# Patient Record
Sex: FEMALE | URBAN - METROPOLITAN AREA
[De-identification: names, ages, dates, MRNs, and addresses within clinical notes are randomized per-mention and may not be internally consistent; named-entity substitution may affect disease eponyms.]

---

## 2023-01-04 ENCOUNTER — LAB OUTSIDE AN ENCOUNTER (OUTPATIENT)
Dept: URBAN - METROPOLITAN AREA CLINIC 27 | Facility: CLINIC | Age: 23
End: 2023-01-04

## 2023-01-04 ENCOUNTER — OFFICE VISIT (OUTPATIENT)
Dept: URBAN - METROPOLITAN AREA CLINIC 27 | Facility: CLINIC | Age: 23
End: 2023-01-04
Payer: COMMERCIAL

## 2023-01-04 ENCOUNTER — TELEPHONE ENCOUNTER (OUTPATIENT)
Dept: URBAN - METROPOLITAN AREA CLINIC 27 | Facility: CLINIC | Age: 23
End: 2023-01-04

## 2023-01-04 VITALS
BODY MASS INDEX: 23.66 KG/M2 | DIASTOLIC BLOOD PRESSURE: 83 MMHG | SYSTOLIC BLOOD PRESSURE: 118 MMHG | HEART RATE: 107 BPM | HEIGHT: 65 IN | WEIGHT: 142 LBS

## 2023-01-04 DIAGNOSIS — R19.7 DIARRHEA: ICD-10-CM

## 2023-01-04 DIAGNOSIS — R63.4 WEIGHT LOSS: ICD-10-CM

## 2023-01-04 DIAGNOSIS — R11.2 NAUSEA & VOMITING: ICD-10-CM

## 2023-01-04 DIAGNOSIS — Z87.19 HISTORY OF SMALL BOWEL OBSTRUCTION: ICD-10-CM

## 2023-01-04 DIAGNOSIS — K50.00 TERMINAL ILEITIS: ICD-10-CM

## 2023-01-04 DIAGNOSIS — R10.84 GENERALIZED ABDOMINAL PAIN: ICD-10-CM

## 2023-01-04 PROBLEM — 235709008 TERMINAL ILEITIS: Status: ACTIVE | Noted: 2023-01-04

## 2023-01-04 PROCEDURE — 99204 OFFICE O/P NEW MOD 45 MIN: CPT | Performed by: INTERNAL MEDICINE

## 2023-01-04 RX ORDER — HYOSCYAMINE SULFATE 0.12 MG/1
1 OR 2 TABLETS AS NEEDED FOR ABDOMINAL PAIN TABLET ORAL EVERY 6 HOURS
Qty: 90 | Refills: 2 | OUTPATIENT
Start: 2023-01-04 | End: 2023-04-04

## 2023-01-04 RX ORDER — PREDNISONE 10 MG/1
4 TABS QD X5D, THEN 3 TABS QD X5D, THEN 2 TABS QD X5D, THEN 1 TAB QD X5D TABLET ORAL ONCE A DAY
Qty: 50 TABLET | Refills: 0 | OUTPATIENT

## 2023-01-04 NOTE — PHYSICAL EXAM GASTROINTESTINAL
Abdomen is soft, mild/moderate TTP diffusely, tonio in the RLQ/lower abdomen, nondistended, no guarding or rigidity, no masses palpable, decreased bowel sounds tonio in the upper abdomen

## 2023-01-04 NOTE — HPI-TODAY'S VISIT:
Patient here self-referred for evaluation of multiple GI symptoms that began 2 weeks ago.  She began having generalized abdominal pain that radiated to her back.  It was quite severe.  She also had multiple episodes of nausea and vomiting of bile.  Her stools were loose, up to 3 stools per day.  They were not bloody nor were they nocturnal.  She denies any trigger for the onset of her symptoms.  She went to a local ER in [place] and was hospitalized there from December 22 to December 25.  None of her records are available but apparently she was diagnosed with "terminal ileitis".  She apparently also had a small bowel obstruction which was treated conservatively and without NG tube placement.  She was given steroids, antibiotics and antiemetics with improvement of her symptoms.  Stool studies were apparently negative.  She was discharged with 20 mg of prednisone daily (which she completed a few days ago) and a 11-day course of Cipro and Flagyl.  She continues to have fairly significant abdominal pain which seems to be worse in the right lower quadrant.  She has been using Tylenol without significant benefit.  She continues to have loose stools.  She does not take a probiotic and does not use any antidiarrheals.  She has lost 15 pounds since the onset of her symptoms but has recently regained some of this.  Her appetite is poor, though she is currently on a regular diet.  She notes that "acidic foods" definitely worsen her symptoms.  Her nausea and vomiting have improved with PRN ondansetron.  She has not had a prior upper or lower endoscopy.  There is no family history of colon cancer, polyps, or IBD.  She apparently had some "shin" bruising on her bilateral lower extremities the week before her hospitalization, though this appears to be improving.  She has not had any fever or chills.

## 2023-01-06 ENCOUNTER — TELEPHONE ENCOUNTER (OUTPATIENT)
Dept: URBAN - METROPOLITAN AREA CLINIC 27 | Facility: CLINIC | Age: 23
End: 2023-01-06

## 2023-01-06 ENCOUNTER — LAB OUTSIDE AN ENCOUNTER (OUTPATIENT)
Dept: URBAN - METROPOLITAN AREA CLINIC 27 | Facility: CLINIC | Age: 23
End: 2023-01-06

## 2023-01-08 LAB
A/G RATIO: 1.2
ABSOLUTE BASOPHILS: 22
ABSOLUTE EOSINOPHILS: 29
ABSOLUTE LYMPHOCYTES: 986
ABSOLUTE MONOCYTES: 1256
ABSOLUTE NEUTROPHILS: 5008
ALBUMIN: 3.3
ALKALINE PHOSPHATASE: 63
ALT (SGPT): 3
AMYLASE: 30
ANCA SCREEN: NEGATIVE
AST (SGOT): 8
BASOPHILS: 0.3
BILIRUBIN, TOTAL: 0.5
BUN/CREATININE RATIO: 8
BUN: 5
C-REACTIVE PROTEIN, QUANT: 36.4
CALCIUM: 8.6
CARBON DIOXIDE, TOTAL: 29
CHLORIDE: 101
CREATININE: 0.61
EGFR: 129
EOSINOPHILS: 0.4
GLOBULIN, TOTAL: 2.8
GLUCOSE: 75
HEMATOCRIT: 32.1
HEMOGLOBIN: 11
LIPASE: 9
LYMPHOCYTES: 13.5
MCH: 25.5
MCHC: 34.3
MCV: 74.5
MONOCYTES: 17.2
MPV: 10.1
MYELOPEROXIDASE ANTIBODY: <1
NEUTROPHILS: 68.6
PLATELET COUNT: 484
POTASSIUM: 3.6
PROTEIN, TOTAL: 6.1
PROTEINASE-3 ANTIBODY: <1
RDW: 17.9
RED BLOOD CELL COUNT: 4.31
SACCHAROMYCES CEREVISIAE AB (ASCA) (IGA): 44.7
SACCHAROMYCES CEREVISIAE AB (ASCA) (IGG): 82
SED RATE BY MODIFIED: 33
SODIUM: 136
TSH: 0.9
WHITE BLOOD CELL COUNT: 7.3

## 2023-01-17 ENCOUNTER — TELEPHONE ENCOUNTER (OUTPATIENT)
Dept: URBAN - METROPOLITAN AREA CLINIC 27 | Facility: CLINIC | Age: 23
End: 2023-01-17

## 2023-01-17 RX ORDER — SODIUM, POTASSIUM,MAG SULFATES 17.5-3.13G
177ML SOLUTION, RECONSTITUTED, ORAL ORAL 1
Qty: 177 MILLILITER | OUTPATIENT
Start: 2023-01-17 | End: 2023-01-18

## 2023-01-17 RX ORDER — HYOSCYAMINE SULFATE 0.12 MG/1
1 OR 2 TABLETS AS NEEDED FOR ABDOMINAL PAIN TABLET ORAL EVERY 6 HOURS
Qty: 90 | Refills: 2 | Status: ACTIVE | COMMUNITY
Start: 2023-01-04 | End: 2023-04-04

## 2023-01-17 RX ORDER — PREDNISONE 10 MG/1
4 TABS QD X5D, THEN 3 TABS QD X5D, THEN 2 TABS QD X5D, THEN 1 TAB QD X5D TABLET ORAL ONCE A DAY
Qty: 50 TABLET | Refills: 0 | Status: ACTIVE | COMMUNITY

## 2023-01-25 ENCOUNTER — WEB ENCOUNTER (OUTPATIENT)
Dept: URBAN - METROPOLITAN AREA CLINIC 27 | Facility: CLINIC | Age: 23
End: 2023-01-25

## 2023-01-26 ENCOUNTER — ERX REFILL RESPONSE (OUTPATIENT)
Dept: URBAN - METROPOLITAN AREA CLINIC 27 | Facility: CLINIC | Age: 23
End: 2023-01-26

## 2023-01-26 ENCOUNTER — WEB ENCOUNTER (OUTPATIENT)
Dept: URBAN - METROPOLITAN AREA CLINIC 27 | Facility: CLINIC | Age: 23
End: 2023-01-26

## 2023-01-26 RX ORDER — PREDNISONE 10 MG/1
TAKE 4 TABLETS BY MOUTH EVERY DAY FOR 5 DAYS, 3 FOR 5 DAYS, 2 FOR 5 DAYS, 1 FOR 5 DAYS TABLET ORAL ONCE A DAY
Qty: 50 TABLET | Refills: 0 | OUTPATIENT

## 2023-01-26 RX ORDER — PREDNISONE 10 MG/1
TAKE 4 TABLETS BY MOUTH EVERY DAY FOR 5 DAYS, 3 FOR 5 DAYS, 2 FOR 5 DAYS, 1 FOR 5 DAYS TABLET ORAL
Qty: 50 TABLET | Refills: 0 | OUTPATIENT

## 2023-01-26 RX ORDER — PREDNISONE 10 MG/1
TAKE 4 TABLETS BY MOUTH EVERY DAY FOR 5 DAYS, 3 FOR 5 DAYS, 2 FOR 5 DAYS, 1 FOR 5 DAYS TABLET ORAL
Qty: 50 TABLET | Refills: 0 | Status: ACTIVE | COMMUNITY

## 2023-01-26 RX ORDER — PREDNISONE 20 MG/1
2 TABLET TABLET ORAL ONCE A DAY
Qty: 60 TABLET | Refills: 1 | OUTPATIENT
Start: 2023-01-27 | End: 2023-03-28

## 2023-01-26 RX ORDER — HYOSCYAMINE SULFATE 0.12 MG/1
1 OR 2 TABLETS AS NEEDED FOR ABDOMINAL PAIN TABLET ORAL EVERY 6 HOURS
Qty: 90 | Refills: 2 | Status: ACTIVE | COMMUNITY
Start: 2023-01-04 | End: 2023-04-04

## 2023-02-20 ENCOUNTER — WEB ENCOUNTER (OUTPATIENT)
Dept: URBAN - METROPOLITAN AREA CLINIC 27 | Facility: CLINIC | Age: 23
End: 2023-02-20

## 2023-02-21 ENCOUNTER — WEB ENCOUNTER (OUTPATIENT)
Dept: URBAN - METROPOLITAN AREA SURGERY CENTER 7 | Facility: SURGERY CENTER | Age: 23
End: 2023-02-21

## 2023-02-24 ENCOUNTER — CLAIMS CREATED FROM THE CLAIM WINDOW (OUTPATIENT)
Dept: URBAN - METROPOLITAN AREA CLINIC 4 | Facility: CLINIC | Age: 23
End: 2023-02-24
Payer: COMMERCIAL

## 2023-02-24 ENCOUNTER — LAB OUTSIDE AN ENCOUNTER (OUTPATIENT)
Dept: URBAN - METROPOLITAN AREA CLINIC 27 | Facility: CLINIC | Age: 23
End: 2023-02-24

## 2023-02-24 ENCOUNTER — OFFICE VISIT (OUTPATIENT)
Dept: URBAN - METROPOLITAN AREA SURGERY CENTER 7 | Facility: SURGERY CENTER | Age: 23
End: 2023-02-24
Payer: COMMERCIAL

## 2023-02-24 ENCOUNTER — TELEPHONE ENCOUNTER (OUTPATIENT)
Dept: URBAN - METROPOLITAN AREA CLINIC 27 | Facility: CLINIC | Age: 23
End: 2023-02-24

## 2023-02-24 DIAGNOSIS — K52.89 (LYMPHOCYTIC) MICROSCOPIC COLITIS: ICD-10-CM

## 2023-02-24 DIAGNOSIS — R10.84 ABDOMINAL CRAMPING, GENERALIZED: ICD-10-CM

## 2023-02-24 DIAGNOSIS — R19.7 ACUTE DIARRHEA: ICD-10-CM

## 2023-02-24 DIAGNOSIS — K52.89 OTHER SPECIFIED NONINFECTIVE GASTROENTERITIS AND COLITIS: ICD-10-CM

## 2023-02-24 PROCEDURE — G8907 PT DOC NO EVENTS ON DISCHARG: HCPCS | Performed by: INTERNAL MEDICINE

## 2023-02-24 PROCEDURE — 45380 COLONOSCOPY AND BIOPSY: CPT | Performed by: INTERNAL MEDICINE

## 2023-02-24 PROCEDURE — 88305 TISSUE EXAM BY PATHOLOGIST: CPT | Performed by: PATHOLOGY

## 2023-02-24 RX ORDER — HYOSCYAMINE SULFATE 0.12 MG/1
1 OR 2 TABLETS AS NEEDED FOR ABDOMINAL PAIN TABLET ORAL EVERY 6 HOURS
Qty: 90 | Refills: 2 | Status: ACTIVE | COMMUNITY
Start: 2023-01-04 | End: 2023-04-04

## 2023-02-24 RX ORDER — PREDNISONE 10 MG/1
TAKE 4 TABLETS BY MOUTH EVERY DAY FOR 5 DAYS, 3 FOR 5 DAYS, 2 FOR 5 DAYS, 1 FOR 5 DAYS TABLET ORAL
Qty: 50 TABLET | Refills: 0 | Status: ACTIVE | COMMUNITY

## 2023-02-24 RX ORDER — PREDNISONE 20 MG/1
2 TABLET TABLET ORAL ONCE A DAY
Qty: 60 TABLET | Refills: 1 | Status: ACTIVE | COMMUNITY
Start: 2023-01-27 | End: 2023-03-28

## 2023-02-24 RX ORDER — MERCAPTOPURINE 50 MG/1
TAKE 1/2 TABLET ONCE DAILY FOR 2WKS THEN 1 TAB ONCE DAILY THEREAFTER TABLET ORAL ONCE DAILY
Qty: 30 | Refills: 2 | OUTPATIENT

## 2023-02-25 ENCOUNTER — TELEPHONE ENCOUNTER (OUTPATIENT)
Dept: URBAN - METROPOLITAN AREA CLINIC 27 | Facility: CLINIC | Age: 23
End: 2023-02-25

## 2023-03-01 ENCOUNTER — TELEPHONE ENCOUNTER (OUTPATIENT)
Dept: URBAN - METROPOLITAN AREA CLINIC 27 | Facility: CLINIC | Age: 23
End: 2023-03-01

## 2023-03-08 LAB
FERRITIN, SERUM: 94
HEPATITIS A AB, TOTAL: REACTIVE
HEPATITIS B CORE AB TOTAL: (no result)
HEPATITIS B SURFACE ANTIBODY QL: REACTIVE
HEPATITIS B SURFACE ANTIGEN: (no result)
IRON BIND.CAP.(TIBC): 276
IRON SATURATION: 19
IRON: 53
TPMT ACTIVITY: 11

## 2023-03-14 ENCOUNTER — TELEPHONE ENCOUNTER (OUTPATIENT)
Dept: URBAN - METROPOLITAN AREA CLINIC 27 | Facility: CLINIC | Age: 23
End: 2023-03-14

## 2023-03-15 ENCOUNTER — OFFICE VISIT (OUTPATIENT)
Dept: URBAN - METROPOLITAN AREA CLINIC 27 | Facility: CLINIC | Age: 23
End: 2023-03-15

## 2023-03-16 ENCOUNTER — CLAIMS CREATED FROM THE CLAIM WINDOW (OUTPATIENT)
Dept: URBAN - METROPOLITAN AREA MEDICAL CENTER 8 | Facility: MEDICAL CENTER | Age: 23
End: 2023-03-16

## 2023-03-16 ENCOUNTER — CLAIMS CREATED FROM THE CLAIM WINDOW (OUTPATIENT)
Dept: URBAN - METROPOLITAN AREA MEDICAL CENTER 8 | Facility: MEDICAL CENTER | Age: 23
End: 2023-03-16
Payer: COMMERCIAL

## 2023-03-16 DIAGNOSIS — K50.00 CICATRIZING ENTEROCOLITIS: ICD-10-CM

## 2023-03-16 DIAGNOSIS — R93.3 ABN FINDINGS-GI TRACT: ICD-10-CM

## 2023-03-16 PROCEDURE — 99222 1ST HOSP IP/OBS MODERATE 55: CPT | Performed by: INTERNAL MEDICINE

## 2023-03-16 PROCEDURE — G8427 DOCREV CUR MEDS BY ELIG CLIN: HCPCS | Performed by: INTERNAL MEDICINE

## 2023-03-16 PROCEDURE — 99254 IP/OBS CNSLTJ NEW/EST MOD 60: CPT | Performed by: INTERNAL MEDICINE

## 2023-03-17 ENCOUNTER — CLAIMS CREATED FROM THE CLAIM WINDOW (OUTPATIENT)
Dept: URBAN - METROPOLITAN AREA MEDICAL CENTER 8 | Facility: MEDICAL CENTER | Age: 23
End: 2023-03-17
Payer: COMMERCIAL

## 2023-03-17 ENCOUNTER — CLAIMS CREATED FROM THE CLAIM WINDOW (OUTPATIENT)
Dept: URBAN - METROPOLITAN AREA MEDICAL CENTER 8 | Facility: MEDICAL CENTER | Age: 23
End: 2023-03-17

## 2023-03-17 DIAGNOSIS — K50.00 CICATRIZING ENTEROCOLITIS: ICD-10-CM

## 2023-03-17 PROCEDURE — 99232 SBSQ HOSP IP/OBS MODERATE 35: CPT | Performed by: INTERNAL MEDICINE

## 2023-03-18 ENCOUNTER — CLAIMS CREATED FROM THE CLAIM WINDOW (OUTPATIENT)
Dept: URBAN - METROPOLITAN AREA MEDICAL CENTER 8 | Facility: MEDICAL CENTER | Age: 23
End: 2023-03-18
Payer: COMMERCIAL

## 2023-03-18 ENCOUNTER — OUT OF OFFICE VISIT (OUTPATIENT)
Dept: URBAN - METROPOLITAN AREA MEDICAL CENTER 8 | Facility: MEDICAL CENTER | Age: 23
End: 2023-03-18

## 2023-03-18 DIAGNOSIS — K50.00 CICATRIZING ENTEROCOLITIS: ICD-10-CM

## 2023-03-18 PROCEDURE — 99232 SBSQ HOSP IP/OBS MODERATE 35: CPT | Performed by: INTERNAL MEDICINE

## 2023-03-20 ENCOUNTER — OUT OF OFFICE VISIT (OUTPATIENT)
Dept: URBAN - METROPOLITAN AREA MEDICAL CENTER 8 | Facility: MEDICAL CENTER | Age: 23
End: 2023-03-20

## 2023-03-20 ENCOUNTER — CLAIMS CREATED FROM THE CLAIM WINDOW (OUTPATIENT)
Dept: URBAN - METROPOLITAN AREA MEDICAL CENTER 8 | Facility: MEDICAL CENTER | Age: 23
End: 2023-03-20
Payer: COMMERCIAL

## 2023-03-20 DIAGNOSIS — K50.00 CICATRIZING ENTEROCOLITIS: ICD-10-CM

## 2023-03-20 PROCEDURE — 99232 SBSQ HOSP IP/OBS MODERATE 35: CPT | Performed by: INTERNAL MEDICINE

## 2023-03-27 ENCOUNTER — TELEPHONE ENCOUNTER (OUTPATIENT)
Dept: URBAN - METROPOLITAN AREA CLINIC 27 | Facility: CLINIC | Age: 23
End: 2023-03-27

## 2023-03-30 ENCOUNTER — OFFICE VISIT (OUTPATIENT)
Dept: URBAN - METROPOLITAN AREA CLINIC 27 | Facility: CLINIC | Age: 23
End: 2023-03-30

## 2023-03-31 ENCOUNTER — TELEPHONE ENCOUNTER (OUTPATIENT)
Dept: URBAN - METROPOLITAN AREA CLINIC 35 | Facility: CLINIC | Age: 23
End: 2023-03-31

## 2023-03-31 ENCOUNTER — OFFICE VISIT (OUTPATIENT)
Dept: URBAN - METROPOLITAN AREA CLINIC 27 | Facility: CLINIC | Age: 23
End: 2023-03-31
Payer: COMMERCIAL

## 2023-03-31 ENCOUNTER — LAB OUTSIDE AN ENCOUNTER (OUTPATIENT)
Dept: URBAN - METROPOLITAN AREA CLINIC 27 | Facility: CLINIC | Age: 23
End: 2023-03-31

## 2023-03-31 VITALS
BODY MASS INDEX: 21.16 KG/M2 | SYSTOLIC BLOOD PRESSURE: 111 MMHG | DIASTOLIC BLOOD PRESSURE: 75 MMHG | HEIGHT: 65 IN | HEART RATE: 106 BPM | WEIGHT: 127 LBS

## 2023-03-31 DIAGNOSIS — R63.4 WEIGHT LOSS: ICD-10-CM

## 2023-03-31 DIAGNOSIS — R10.84 GENERALIZED ABDOMINAL PAIN: ICD-10-CM

## 2023-03-31 DIAGNOSIS — D64.89 OTHER SPECIFIED ANEMIAS: ICD-10-CM

## 2023-03-31 DIAGNOSIS — K50.00 TERMINAL ILEITIS: ICD-10-CM

## 2023-03-31 PROCEDURE — 99214 OFFICE O/P EST MOD 30 MIN: CPT | Performed by: INTERNAL MEDICINE

## 2023-03-31 RX ORDER — DICYCLOMINE HYDROCHLORIDE 20 MG/1
1 OR 2 TABLETS TABLET ORAL THREE TIMES A DAY
Qty: 90 | Refills: 3 | OUTPATIENT
Start: 2023-03-31 | End: 2023-04-30

## 2023-03-31 RX ORDER — PREDNISONE 10 MG/1
TAKE 4 TABLETS BY MOUTH EVERY DAY FOR 5 DAYS, 3 FOR 5 DAYS, 2 FOR 5 DAYS, 1 FOR 5 DAYS TABLET ORAL
Qty: 50 TABLET | Refills: 0 | Status: ACTIVE | COMMUNITY

## 2023-03-31 RX ORDER — MERCAPTOPURINE 50 MG/1
TAKE 1/2 TABLET ONCE DAILY FOR 2WKS THEN 1 TAB ONCE DAILY THEREAFTER TABLET ORAL ONCE DAILY
Qty: 30 | Refills: 2 | Status: ACTIVE | COMMUNITY

## 2023-03-31 RX ORDER — HYOSCYAMINE SULFATE 0.12 MG/1
1 OR 2 TABLETS AS NEEDED FOR ABDOMINAL PAIN TABLET ORAL EVERY 6 HOURS
Qty: 90 | Refills: 2 | Status: ACTIVE | COMMUNITY
Start: 2023-01-04 | End: 2023-04-04

## 2023-03-31 NOTE — HPI-TODAY'S VISIT:
Patient here for followup of suspected Crohn's ileitis >3mos.  She began having generalized abdominal pain that radiated to her back in late December 2022.  It was quite severe.  She also had multiple episodes of nausea and vomiting of bile.  Her stools were loose, up to 3 stools per day.  They were not bloody nor were they nocturnal.  She denies any trigger for the onset of her symptoms.  She went to a local ER in [place] and was hospitalized there from December 22 to December 25.  None of her records are available but apparently she was diagnosed with "terminal ileitis".  She apparently also had a small bowel obstruction which was treated conservatively and without NG tube placement.  She was given steroids, antibiotics and antiemetics with improvement of her symptoms.  Stool studies were apparently negative.  She was discharged with 20 mg of prednisone daily and a 11-day course of Cipro and Flagyl.  Her symptoms improved, though only transiently.  She was seen in this office in January, and was started back on prednisone 40mg QD; this was helpful.  She underwent colonoscopy last month.  The colon appeared normal though the mucosa at the ileocecal valve was severely erythematous, friable, inflamed, markedly nodular and with some pseudopolyps present.  The scope could not be passed across this area into the terminal ileum because of the luminal narrowing and inflammation.  Biopsies from this area showed active chronic colitis.  She was started on 6-MP following that exam.  TPMT activity was low.  She is immune to hepatitis A and B.  Her hemoglobin in January was 11.0.  Inflammatory bowel disease profile showed a markedly elevated Saccharomyces cerevisciae antibody, both IgA and IgG.  She was hospitalized earlier this month while because of intractable nausea/vomiting and abdominal pain.  CT showed a segment of distal ileum approximately 7 cm in length with marked bowel wall thickening and edema which involved the ileocecal junction.  There was marked luminal narrowing of the involved lumen.  There was a segment of small bowel proximal to this which had an air-fluid level measuring 4 cm in caliber.  There was mild generalized bowel wall thickening and edema of the colon.  Moderate fat stranding around the cecum was seen.  TB testing in the hospital was "indeterminate" but chest x-ray was normal.  She was made NPO, and IV steroids were initiated.  She was started on inflectra, with her first infusion on 3/16.  Her symptoms gradually improved, and she was eventually discharged on 3/21.  Her nausea and vomiting have resolved, and Phenergan is very helpful when she does have symptoms.  Her stools are somewhat irregular, though much improved from previously.  She has a bowel movement every other day.  She states "it sometimes takes a long time on the toilet to have a bowel movement".  Her appetite is improving and she is maintaining her weight.  She is on a low fiber diet.  She continues to have crampy abdominal pain and spasms which seem to be worse in the middle of the abdomen.  Hyoscyamine 1 tablet every 6 hours is somewhat helpful though it makes her sleepy.  She has been compliant with the 6MP 50 mg once daily and prednisone 40 mg once daily.  She received her second dose of inflectra yesterday, which she tolerated well.  She does complain of fatigue and some mild shortness of breath.  She was anemic on discharge with a hemoglobin of 9.3.

## 2023-04-01 LAB
ABSOLUTE BASOPHILS: 26
ABSOLUTE EOSINOPHILS: 26
ABSOLUTE LYMPHOCYTES: 1729
ABSOLUTE MONOCYTES: 1092
ABSOLUTE NEUTROPHILS: 5728
BASOPHILS: 0.3
EOSINOPHILS: 0.3
FERRITIN, SERUM: 183
HEMATOCRIT: 30.2
HEMOGLOBIN: 10.2
IRON BIND.CAP.(TIBC): 219
IRON SATURATION: 52
IRON: 114
LYMPHOCYTES: 20.1
MCH: 27.7
MCHC: 33.8
MCV: 82.1
MONOCYTES: 12.7
MPV: 9.4
NEUTROPHILS: 66.6
PLATELET COUNT: 636
RDW: 16.3
RED BLOOD CELL COUNT: 3.68
WHITE BLOOD CELL COUNT: 8.6

## 2023-04-10 ENCOUNTER — WEB ENCOUNTER (OUTPATIENT)
Dept: URBAN - METROPOLITAN AREA CLINIC 27 | Facility: CLINIC | Age: 23
End: 2023-04-10

## 2023-04-27 ENCOUNTER — TELEPHONE ENCOUNTER (OUTPATIENT)
Dept: URBAN - METROPOLITAN AREA CLINIC 35 | Facility: CLINIC | Age: 23
End: 2023-04-27

## 2023-04-27 RX ORDER — PROMETHAZINE HYDROCHLORIDE 25 MG/1
1/2 OR 1 TABLET AS NEEDED TABLET ORAL
Qty: 90 | Refills: 3 | OUTPATIENT
Start: 2023-04-27 | End: 2023-05-27

## 2023-05-11 ENCOUNTER — OFFICE VISIT (OUTPATIENT)
Dept: URBAN - METROPOLITAN AREA CLINIC 27 | Facility: CLINIC | Age: 23
End: 2023-05-11

## 2023-06-21 ENCOUNTER — TELEPHONE ENCOUNTER (OUTPATIENT)
Dept: URBAN - METROPOLITAN AREA CLINIC 27 | Facility: CLINIC | Age: 23
End: 2023-06-21

## 2023-06-22 ENCOUNTER — TELEPHONE ENCOUNTER (OUTPATIENT)
Dept: URBAN - METROPOLITAN AREA CLINIC 27 | Facility: CLINIC | Age: 23
End: 2023-06-22

## 2023-06-22 ENCOUNTER — OFFICE VISIT (OUTPATIENT)
Dept: URBAN - METROPOLITAN AREA CLINIC 27 | Facility: CLINIC | Age: 23
End: 2023-06-22

## 2023-08-21 ENCOUNTER — TELEPHONE ENCOUNTER (OUTPATIENT)
Dept: URBAN - METROPOLITAN AREA CLINIC 27 | Facility: CLINIC | Age: 23
End: 2023-08-21

## 2023-10-10 ENCOUNTER — LAB OUTSIDE AN ENCOUNTER (OUTPATIENT)
Dept: URBAN - METROPOLITAN AREA CLINIC 27 | Facility: CLINIC | Age: 23
End: 2023-10-10

## 2023-10-10 ENCOUNTER — OFFICE VISIT (OUTPATIENT)
Dept: URBAN - METROPOLITAN AREA CLINIC 27 | Facility: CLINIC | Age: 23
End: 2023-10-10
Payer: COMMERCIAL

## 2023-10-10 VITALS
DIASTOLIC BLOOD PRESSURE: 75 MMHG | HEART RATE: 97 BPM | BODY MASS INDEX: 22.49 KG/M2 | WEIGHT: 135 LBS | HEIGHT: 65 IN | SYSTOLIC BLOOD PRESSURE: 107 MMHG

## 2023-10-10 DIAGNOSIS — N32.1 COLOVESICAL FISTULA: ICD-10-CM

## 2023-10-10 DIAGNOSIS — R10.84 GENERALIZED ABDOMINAL PAIN: ICD-10-CM

## 2023-10-10 DIAGNOSIS — K50.00 CROHN'S DISEASE OF SMALL INTESTINE: ICD-10-CM

## 2023-10-10 DIAGNOSIS — R11.2 NAUSEA & VOMITING: ICD-10-CM

## 2023-10-10 PROCEDURE — 99214 OFFICE O/P EST MOD 30 MIN: CPT | Performed by: INTERNAL MEDICINE

## 2023-10-10 RX ORDER — MERCAPTOPURINE 50 MG/1
TAKE 1/2 TABLET ONCE DAILY FOR 2WKS THEN 1 TAB ONCE DAILY THEREAFTER TABLET ORAL ONCE DAILY
Qty: 30 | Refills: 2 | Status: ACTIVE | COMMUNITY

## 2023-10-10 RX ORDER — PREDNISONE 10 MG/1
TAKE 4 TABLETS BY MOUTH EVERY DAY FOR 5 DAYS, 3 FOR 5 DAYS, 2 FOR 5 DAYS, 1 FOR 5 DAYS TABLET ORAL
Qty: 50 TABLET | Refills: 0 | Status: ON HOLD | COMMUNITY

## 2023-10-10 NOTE — HPI-TODAY'S VISIT:
Patient here for followup of suspected Crohn's ileitis x1y.  She has not been seen in this office since March.  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  She was seen by urology and underwent cystoscopy in August which apparently confirmed the presence of the fistula. She was also seen by colorectal surgery, and surgical intervention was recommended, though the patient declined this; it was recommended that she follow-up here.  She states that she actually feels quite well. Her hematuria and pneumaturia have apparently resolved.  She is still receiving Inflectra 300mg every 8 weeks (since March) every 8 weeks. She is due for her next infusion on October 23. She is still taking 6MP 50 mg once daily. She denies abdominal pain with use of dicyclomine 40 mg once daily. She has not needed to increase this. Her severe nausea has dramatically improved with Phenergan 25 mg once daily. She denies significant diarrhea at present. She currently has 1-3 formed stools a day. She does not take a probiotic. She has regained 30 pounds since being hospitalized earlier this year. She overall feels "much better". She had a positive urine culture in July and was treated with nitrofurantoin, Macrobid and Myrbetriq. Urinalysis in late September showed 2+ blood, and moderate leukocyte esterase. She underwent an upper endoscopy by a different gastroenterologist in July because of severe nausea and vomiting. This was a normal exam. She apparently is no longer seeing this gastroenterologist and would like to continue care here.   She began having generalized abdominal pain that radiated to her back in late December 2022.  It was quite severe.  She also had multiple episodes of nausea and vomiting of bile.  Her stools were loose, up to 3 stools per day.  They were not bloody nor were they nocturnal.  She denies any trigger for the onset of her symptoms.  She went to a local ER in [place] and was hospitalized there from December 22 to December 25.  None of those records are available but apparently she was diagnosed with "terminal ileitis".  She apparently also had a small bowel obstruction which was treated conservatively and without NG tube placement.  She was given steroids, antibiotics and antiemetics with improvement of her symptoms.  Stool studies were apparently negative.  She was discharged with 20 mg of prednisone daily and a 11-day course of Cipro and Flagyl.  Her symptoms improved, though only transiently.  She was seen in this office in January, and was started back on prednisone 40mg QD; this was helpful.  She is no longer taking steroids. She is not currently taking a probiotic; a trial of Align earlier this year caused abdominal pain.  She underwent colonoscopy in February. The colon appeared normal though the mucosa at the ileocecal valve was severely erythematous, friable, inflamed, markedly nodular and with some pseudopolyps present.  The scope could not pass across this area into the terminal ileum because of the luminal narrowing and inflammation.  Biopsies from this area showed active chronic colitis.  She was started on 6MP following that exam.  TPMT activity was low.  She is immune to hepatitis A and B.  Her hemoglobin in January was 11.0.  Inflammatory bowel disease profile showed a markedly elevated Saccharomyces cerevisciae antibody, both IgA and IgG.  She was hospitalized in March because of intractable nausea/vomiting and abdominal pain.  CT showed a segment of distal ileum approximately 7 cm in length with marked bowel wall thickening and edema which involved the ileocecal junction.  There was marked luminal narrowing of the involved lumen.  There was a segment of small bowel proximal to this which had an air-fluid level measuring 4 cm in caliber.  There was mild generalized bowel wall thickening and edema of the colon.  Moderate fat stranding around the cecum was seen.  TB testing in the hospital was "indeterminate" but chest x-ray was normal.  She was made NPO, and IV steroids were initiated.  She was started on inflectra, with her first infusion on 3/16.  Her symptoms gradually improved, and she was eventually discharged on 3/21.

## 2023-10-11 ENCOUNTER — LAB OUTSIDE AN ENCOUNTER (OUTPATIENT)
Dept: URBAN - METROPOLITAN AREA CLINIC 27 | Facility: CLINIC | Age: 23
End: 2023-10-11

## 2023-10-18 LAB
6 MMP: 6089
6 TG: 333
A/G RATIO: 1
ABSOLUTE BASOPHILS: 22
ABSOLUTE EOSINOPHILS: 50
ABSOLUTE LYMPHOCYTES: 803
ABSOLUTE MONOCYTES: 550
ABSOLUTE NEUTROPHILS: 4076
ALBUMIN: 3.3
ALKALINE PHOSPHATASE: 88
ALT (SGPT): 8
AMYLASE: 43
AST (SGOT): 10
BASOPHILS: 0.4
BILIRUBIN, TOTAL: 0.2
BUN/CREATININE RATIO: 20
BUN: 10
C-REACTIVE PROTEIN, QUANT: 1.8
CALCIUM: 9
CARBON DIOXIDE, TOTAL: 25
CHLORIDE: 107
COMMENT: (no result)
CREATININE: 0.49
EGFR: 135
EOSINOPHILS: 0.9
FERRITIN, SERUM: 20
GLOBULIN, TOTAL: 3.2
GLUCOSE: 77
HEMATOCRIT: 31.7
HEMOGLOBIN: 11
INFLIXIMAB LEVEL, IBD: 4.4
INTERPRETATION: (no result)
IRON BIND.CAP.(TIBC): 274
IRON SATURATION: 30
IRON: 83
LIPASE: 14
LYMPHOCYTES: 14.6
MCH: 30.8
MCHC: 34.7
MCV: 88.8
MITOGEN-NIL: 1.6
MONOCYTES: 10
MPV: 9.9
NEUTROPHILS: 74.1
PLATELET COUNT: 457
POTASSIUM: 4.1
PROTEIN, TOTAL: 6.5
QUANTIFERON NIL VALUE: 0.01
QUANTIFERON TB1 AG VALUE: 0.01
QUANTIFERON TB2 AG VALUE: 0.01
QUANTIFERON-TB GOLD PLUS: NEGATIVE
RDW: 14.4
RED BLOOD CELL COUNT: 3.57
SED RATE BY MODIFIED: 33
SODIUM: 138
WHITE BLOOD CELL COUNT: 5.5

## 2023-10-24 ENCOUNTER — CLAIMS CREATED FROM THE CLAIM WINDOW (OUTPATIENT)
Dept: URBAN - METROPOLITAN AREA MEDICAL CENTER 8 | Facility: MEDICAL CENTER | Age: 23
End: 2023-10-24
Payer: COMMERCIAL

## 2023-10-24 DIAGNOSIS — K92.0 BLOODY EMESIS: ICD-10-CM

## 2023-10-24 DIAGNOSIS — K50.012 CROHN'S DISEASE OF DUODENUM WITH INTESTINAL OBSTRUCTION: ICD-10-CM

## 2023-10-24 DIAGNOSIS — R93.3 ABN FINDINGS-GI TRACT: ICD-10-CM

## 2023-10-24 PROCEDURE — 99254 IP/OBS CNSLTJ NEW/EST MOD 60: CPT | Performed by: INTERNAL MEDICINE

## 2023-10-25 ENCOUNTER — CLAIMS CREATED FROM THE CLAIM WINDOW (OUTPATIENT)
Dept: URBAN - METROPOLITAN AREA MEDICAL CENTER 8 | Facility: MEDICAL CENTER | Age: 23
End: 2023-10-25
Payer: COMMERCIAL

## 2023-10-25 DIAGNOSIS — K50.012 CROHN'S DISEASE OF DUODENUM WITH INTESTINAL OBSTRUCTION: ICD-10-CM

## 2023-10-25 DIAGNOSIS — K50.013 CROHN'S DISEASE OF DUODENUM WITH FISTULA: ICD-10-CM

## 2023-10-25 PROCEDURE — 99232 SBSQ HOSP IP/OBS MODERATE 35: CPT | Performed by: INTERNAL MEDICINE

## 2023-10-26 ENCOUNTER — CLAIMS CREATED FROM THE CLAIM WINDOW (OUTPATIENT)
Dept: URBAN - METROPOLITAN AREA MEDICAL CENTER 8 | Facility: MEDICAL CENTER | Age: 23
End: 2023-10-26
Payer: COMMERCIAL

## 2023-10-26 DIAGNOSIS — K50.013 CROHN'S DISEASE OF DUODENUM WITH FISTULA: ICD-10-CM

## 2023-10-26 DIAGNOSIS — K50.012 CROHN'S DISEASE OF DUODENUM WITH INTESTINAL OBSTRUCTION: ICD-10-CM

## 2023-10-26 PROCEDURE — 99232 SBSQ HOSP IP/OBS MODERATE 35: CPT | Performed by: INTERNAL MEDICINE

## 2023-10-27 ENCOUNTER — CLAIMS CREATED FROM THE CLAIM WINDOW (OUTPATIENT)
Dept: URBAN - METROPOLITAN AREA MEDICAL CENTER 8 | Facility: MEDICAL CENTER | Age: 23
End: 2023-10-27
Payer: COMMERCIAL

## 2023-10-27 DIAGNOSIS — K50.012 CROHN'S DISEASE OF DUODENUM WITH INTESTINAL OBSTRUCTION: ICD-10-CM

## 2023-10-27 DIAGNOSIS — K50.013 CROHN'S DISEASE OF DUODENUM WITH FISTULA: ICD-10-CM

## 2023-10-27 PROCEDURE — 99231 SBSQ HOSP IP/OBS SF/LOW 25: CPT | Performed by: INTERNAL MEDICINE

## 2023-11-27 ENCOUNTER — TELEPHONE ENCOUNTER (OUTPATIENT)
Dept: URBAN - METROPOLITAN AREA CLINIC 27 | Facility: CLINIC | Age: 23
End: 2023-11-27

## 2024-01-22 ENCOUNTER — WEB ENCOUNTER (OUTPATIENT)
Dept: URBAN - METROPOLITAN AREA CLINIC 27 | Facility: CLINIC | Age: 24
End: 2024-01-22

## 2024-02-29 ENCOUNTER — LAB (OUTPATIENT)
Dept: URBAN - METROPOLITAN AREA CLINIC 27 | Facility: CLINIC | Age: 24
End: 2024-02-29

## 2024-02-29 ENCOUNTER — OV EP (OUTPATIENT)
Dept: URBAN - METROPOLITAN AREA CLINIC 27 | Facility: CLINIC | Age: 24
End: 2024-02-29

## 2024-02-29 VITALS
HEART RATE: 98 BPM | SYSTOLIC BLOOD PRESSURE: 111 MMHG | WEIGHT: 144 LBS | HEIGHT: 65 IN | DIASTOLIC BLOOD PRESSURE: 74 MMHG | BODY MASS INDEX: 23.99 KG/M2

## 2024-02-29 RX ORDER — MERCAPTOPURINE 50 MG/1
TAKE 1/2 TABLET ONCE DAILY FOR 2WKS THEN 1 TAB ONCE DAILY THEREAFTER TABLET ORAL ONCE DAILY
Qty: 30 | Refills: 2 | Status: ACTIVE | COMMUNITY

## 2024-02-29 RX ORDER — PREDNISONE 10 MG/1
TAKE 4 TABLETS BY MOUTH EVERY DAY FOR 5 DAYS, 3 FOR 5 DAYS, 2 FOR 5 DAYS, 1 FOR 5 DAYS TABLET ORAL
Qty: 50 TABLET | Refills: 0 | Status: ON HOLD | COMMUNITY

## 2024-02-29 NOTE — HPI-TODAY'S VISIT:
Patient here for followup of Crohn's ileitis x2y.  She underwent laparoscopic ileocolic resection, takedown of an enterovesicular fistula and laparoscopic repair of enterovesicular fistula with cystoscopy and ureteral stent insertion on January 26. Since then she has done very well. She currently has no GI symptoms. Her stools are regular and she has no abdominal pain. She is no longer using dicyclomine. She is still receiving Inflectra 300mg every 8 weeks, and is still taking mercaptopurine 50 mg once daily. She does not take a probiotic. She still avoids raw vegetables. Recent labs from earlier this month show a normal creatinine and liver enzymes. Her hemoglobin was 9.4 and her platelet count was 721K. Labs from last October showed a sed rate of 33, therapeutic 6TG levels but a elevated 6 MMP level of 6089. Her infliximab level was low at 4.4. Antibody testing was not performed. Her iron levels were normal last October and her hemoglobin was 11.0. Lipase and amylase were normal. TB testing was negative last October, and CRP was normal. CT abdomen pelvis last October showed severely dilated distal small bowel, slightly worsened from prior exam with a thickened terminal ileum consistent with chronic obstruction. Persistent marked thickening of the terminal ileum was seen as well as a interval decrease in the size of the hypodense complex collection associated with fistulous tract and the anterior bladder wall. She underwent MRI of the pelvis last November which again showed marked acute on chronic terminal ileitis with associated with severe stricturing causing partial small bowel obstruction. Developing fistulization was again seen from the bladder dome to the small bowel with associated reactive cystitis/phlegmon formation. Because of the chronic inflammation, stricturing and persistent fistula, she underwent surgery last month.   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  She was seen by urology and underwent cystoscopy in August which apparently confirmed the presence of the fistula. She was also seen by colorectal surgery, and surgical intervention was recommended, though the patient initially declined this. She has been on Inflectra 300mg every 8 weeks (since March 2023). She does not take a probiotic.   She began having generalized abdominal pain that radiated to her back in late December 2022.  It was quite severe.  She also had multiple episodes of nausea and vomiting of bile.  Her stools were loose, up to 3 stools per day.  They were not bloody nor were they nocturnal.  She denies any trigger for the onset of her symptoms.  She went to a local ER in [place] and was hospitalized there from December 22 to December 25.  None of those records are available but apparently she was diagnosed with "terminal ileitis".  She apparently also had a small bowel obstruction which was treated conservatively and without NG tube placement.  She was given steroids, antibiotics and antiemetics with improvement of her symptoms.  Stool studies were apparently negative.  She was discharged with 20 mg of prednisone daily and a 11-day course of Cipro and Flagyl.  Her symptoms improved, though only transiently.  She was seen in this office in January, and was started back on prednisone 40mg QD; this was helpful.  She is no longer taking steroids. She is not currently taking a probiotic; a trial of Align last year caused abdominal pain.  She underwent colonoscopy in February 2023. The colon appeared normal though the mucosa at the ileocecal valve was severely erythematous, friable, inflamed, markedly nodular and with some pseudopolyps present.  The scope could not pass across this area into the terminal ileum because of the luminal narrowing and inflammation.  Biopsies from this area showed active chronic colitis.  She was started on 6MP following that exam.  TPMT activity was low.  She is immune to hepatitis A and B. An inflammatory bowel disease profile last year showed a markedly elevated Saccharomyces cerevisciae antibody, both IgA and IgG.  She was hospitalized last March because of intractable nausea/vomiting and abdominal pain.  CT showed a segment of distal ileum approximately 7 cm in length with marked bowel wall thickening and edema which involved the ileocecal junction.  There was marked luminal narrowing of the involved lumen.  There was a segment of small bowel proximal to this which had an air-fluid level measuring 4 cm in caliber.  There was mild generalized bowel wall thickening and edema of the colon.  Moderate fat stranding around the cecum was seen.  TB testing in the hospital was "indeterminate" but chest x-ray was normal.  She was made NPO, and IV steroids were initiated.  She was started on inflectra, with her first infusion on 3/16.  Her symptoms gradually improved, and she was eventually discharged on 3/21.

## 2024-02-29 NOTE — PHYSICAL EXAM GASTROINTESTINAL
Abdomen is soft, nontender, nondistended, no guarding or rigidity, no masses palpable, normal bowel sounds; well-healed lap incisions

## 2024-06-11 ENCOUNTER — OUT OF OFFICE VISIT (OUTPATIENT)
Dept: URBAN - METROPOLITAN AREA SURGERY CENTER 7 | Facility: SURGERY CENTER | Age: 24
End: 2024-06-11
Payer: COMMERCIAL

## 2024-06-11 ENCOUNTER — CLAIMS CREATED FROM THE CLAIM WINDOW (OUTPATIENT)
Dept: URBAN - METROPOLITAN AREA CLINIC 4 | Facility: CLINIC | Age: 24
End: 2024-06-11
Payer: COMMERCIAL

## 2024-06-11 ENCOUNTER — TELEPHONE ENCOUNTER (OUTPATIENT)
Dept: URBAN - METROPOLITAN AREA CLINIC 27 | Facility: CLINIC | Age: 24
End: 2024-06-11

## 2024-06-11 DIAGNOSIS — Z12.11 COLON CANCER SCREENING (HIGH RISK): ICD-10-CM

## 2024-06-11 DIAGNOSIS — K63.89 OTHER SPECIFIED DISEASES OF INTESTINE: ICD-10-CM

## 2024-06-11 DIAGNOSIS — K52.89 OTHER SPECIFIED NONINFECTIVE GASTROENTERITIS AND COLITIS: ICD-10-CM

## 2024-06-11 DIAGNOSIS — K50.90 CROHN DISEASE: ICD-10-CM

## 2024-06-11 DIAGNOSIS — Z98.0 INTESTINAL BYPASS AND ANASTOMOSIS STATUS: ICD-10-CM

## 2024-06-11 DIAGNOSIS — K64.0 GRADE I INTERNAL HEMORRHOIDS: ICD-10-CM

## 2024-06-11 DIAGNOSIS — K63.3 ULCER OF INTESTINE: ICD-10-CM

## 2024-06-11 PROCEDURE — 88342 IMHCHEM/IMCYTCHM 1ST ANTB: CPT | Performed by: PATHOLOGY

## 2024-06-11 PROCEDURE — 88341 IMHCHEM/IMCYTCHM EA ADD ANTB: CPT | Performed by: PATHOLOGY

## 2024-06-11 PROCEDURE — 45380 COLONOSCOPY AND BIOPSY: CPT | Performed by: INTERNAL MEDICINE

## 2024-06-11 PROCEDURE — 88305 TISSUE EXAM BY PATHOLOGIST: CPT | Performed by: PATHOLOGY

## 2024-06-11 PROCEDURE — 00812 ANES LWR INTST SCR COLSC: CPT | Performed by: NURSE ANESTHETIST, CERTIFIED REGISTERED

## 2024-06-11 RX ORDER — MERCAPTOPURINE 50 MG/1
TAKE ONE TABLET DAILY TABLET ORAL ONCE DAILY
Qty: 90 | Refills: 3 | Status: ACTIVE | COMMUNITY

## 2024-06-11 RX ORDER — PREDNISONE 10 MG/1
TAKE 4 TABLETS BY MOUTH EVERY DAY FOR 5 DAYS, 3 FOR 5 DAYS, 2 FOR 5 DAYS, 1 FOR 5 DAYS TABLET ORAL
Qty: 50 TABLET | Refills: 0 | Status: ON HOLD | COMMUNITY

## 2024-06-20 ENCOUNTER — TELEPHONE ENCOUNTER (OUTPATIENT)
Dept: URBAN - METROPOLITAN AREA CLINIC 27 | Facility: CLINIC | Age: 24
End: 2024-06-20

## 2024-06-20 LAB
COMMENT: (no result)
FERRITIN, SERUM: 8
INFLIXIMAB AB, IBD: <10
INFLIXIMAB DRUG LEVEL: 46.7
INTERPRETATION: (no result)
IRON, TOTAL: 53
MITOGEN-NIL: 9.03
QUANTIFERON NIL VALUE: 0.03
QUANTIFERON TB1 AG VALUE: 0.04
QUANTIFERON TB2 AG VALUE: 0.04
QUANTIFERON-TB GOLD PLUS: NEGATIVE
TPMT ACTIVITY: 16

## 2024-06-27 ENCOUNTER — TELEPHONE ENCOUNTER (OUTPATIENT)
Dept: URBAN - METROPOLITAN AREA CLINIC 27 | Facility: CLINIC | Age: 24
End: 2024-06-27

## 2024-07-09 ENCOUNTER — WEB ENCOUNTER (OUTPATIENT)
Dept: URBAN - METROPOLITAN AREA CLINIC 27 | Facility: CLINIC | Age: 24
End: 2024-07-09

## 2024-07-18 ENCOUNTER — TELEPHONE ENCOUNTER (OUTPATIENT)
Dept: URBAN - METROPOLITAN AREA CLINIC 27 | Facility: CLINIC | Age: 24
End: 2024-07-18

## 2024-07-18 ENCOUNTER — WEB ENCOUNTER (OUTPATIENT)
Dept: URBAN - METROPOLITAN AREA CLINIC 27 | Facility: CLINIC | Age: 24
End: 2024-07-18

## 2024-07-18 RX ORDER — FERRIC CARBOXYMALTOSE INJECTION 50 MG/ML
AS DIRECTED INJECTION, SOLUTION INTRAVENOUS
OUTPATIENT
Start: 2024-07-18 | End: 2024-08-01

## 2024-07-23 ENCOUNTER — WEB ENCOUNTER (OUTPATIENT)
Dept: URBAN - METROPOLITAN AREA CLINIC 27 | Facility: CLINIC | Age: 24
End: 2024-07-23

## 2024-07-25 ENCOUNTER — WEB ENCOUNTER (OUTPATIENT)
Dept: URBAN - METROPOLITAN AREA CLINIC 27 | Facility: CLINIC | Age: 24
End: 2024-07-25

## 2024-08-20 ENCOUNTER — WEB ENCOUNTER (OUTPATIENT)
Dept: URBAN - METROPOLITAN AREA CLINIC 27 | Facility: CLINIC | Age: 24
End: 2024-08-20

## 2024-08-30 ENCOUNTER — TELEPHONE ENCOUNTER (OUTPATIENT)
Dept: URBAN - METROPOLITAN AREA CLINIC 27 | Facility: CLINIC | Age: 24
End: 2024-08-30

## 2024-09-16 ENCOUNTER — TELEPHONE ENCOUNTER (OUTPATIENT)
Dept: URBAN - METROPOLITAN AREA CLINIC 27 | Facility: CLINIC | Age: 24
End: 2024-09-16

## 2024-09-16 RX ORDER — INFLIXIMAB 100 MG/10ML
AS DIRECTED INJECTION, POWDER, LYOPHILIZED, FOR SOLUTION INTRAVENOUS
OUTPATIENT
Start: 2024-09-16

## 2024-09-24 ENCOUNTER — P2P PATIENT RECORD (OUTPATIENT)
Age: 24
End: 2024-09-24

## 2024-10-07 ENCOUNTER — OFFICE VISIT (OUTPATIENT)
Dept: URBAN - METROPOLITAN AREA CLINIC 27 | Facility: CLINIC | Age: 24
End: 2024-10-07
Payer: COMMERCIAL

## 2024-10-07 ENCOUNTER — DASHBOARD ENCOUNTERS (OUTPATIENT)
Age: 24
End: 2024-10-07

## 2024-10-07 ENCOUNTER — LAB OUTSIDE AN ENCOUNTER (OUTPATIENT)
Dept: URBAN - METROPOLITAN AREA CLINIC 27 | Facility: CLINIC | Age: 24
End: 2024-10-07

## 2024-10-07 VITALS
BODY MASS INDEX: 27.32 KG/M2 | HEIGHT: 65 IN | SYSTOLIC BLOOD PRESSURE: 114 MMHG | WEIGHT: 164 LBS | DIASTOLIC BLOOD PRESSURE: 73 MMHG | HEART RATE: 89 BPM

## 2024-10-07 DIAGNOSIS — R11.2 NAUSEA & VOMITING: ICD-10-CM

## 2024-10-07 DIAGNOSIS — R10.84 GENERALIZED ABDOMINAL PAIN: ICD-10-CM

## 2024-10-07 DIAGNOSIS — D64.89 OTHER SPECIFIED ANEMIAS: ICD-10-CM

## 2024-10-07 DIAGNOSIS — R19.7 DIARRHEA: ICD-10-CM

## 2024-10-07 DIAGNOSIS — N32.1 COLOVESICAL FISTULA: ICD-10-CM

## 2024-10-07 DIAGNOSIS — R63.4 WEIGHT LOSS: ICD-10-CM

## 2024-10-07 DIAGNOSIS — F41.9 ANXIETY: ICD-10-CM

## 2024-10-07 DIAGNOSIS — K50.00 CROHN'S DISEASE OF SMALL INTESTINE: ICD-10-CM

## 2024-10-07 PROCEDURE — 99214 OFFICE O/P EST MOD 30 MIN: CPT | Performed by: INTERNAL MEDICINE

## 2024-10-07 RX ORDER — INFLIXIMAB 100 MG/10ML
AS DIRECTED INJECTION, POWDER, LYOPHILIZED, FOR SOLUTION INTRAVENOUS
Status: DISCONTINUED | COMMUNITY
Start: 2024-09-16

## 2024-10-07 RX ORDER — MERCAPTOPURINE 50 MG/1
TAKE ONE TABLET DAILY TABLET ORAL ONCE DAILY
Qty: 90 | Refills: 3 | Status: ACTIVE | COMMUNITY

## 2024-10-07 RX ORDER — PREDNISONE 10 MG/1
TAKE 4 TABLETS BY MOUTH EVERY DAY FOR 5 DAYS, 3 FOR 5 DAYS, 2 FOR 5 DAYS, 1 FOR 5 DAYS TABLET ORAL
Qty: 50 TABLET | Refills: 0 | Status: ON HOLD | COMMUNITY

## 2024-10-07 NOTE — HPI-TODAY'S VISIT:
Patient here for followup of Crohn's ileitis x2y.  She underwent laparoscopic ileocolic resection, takedown of an enterovesicular fistula and laparoscopic repair of enterovesicular fistula with cystoscopy and ureteral stent insertion on January 26. Since then she has done very well. Due to lack of insurance, she has not received an inflectra infusion since early June. She was also scheduled to receive IV iron this past summer; this did not occur. She actually has no GI symptoms at present. She does take a probiotic. She is still taking mercaptopurine 50 mg once daily. Labs at last check in this office (June) showed a ferritin of 8, a negative TB test, therapeutic infliximab levels and no antibodies. Her hemoglobin was 9.7 early this year. 6TG levels were therapeutic at last check. She underwent colonoscopy in June which revealed normal-appearing colonic and distal terminal ileum mucosa. An intact ileocolonic anastomosis in the mid-ascending colon was present. The anastomosis was erythematous, with multiple circumferential ulcerations present. Biopsies from the anastomosis showed chronic active enterocolitis with associated ulcer and granulation tissue, most consistent with anastomosis related changes. Random colon biopsies were normal. She denies significant anxiety at present.   CT abdomen pelvis last October showed severely dilated distal small bowel, slightly worsened from prior exam with a thickened terminal ileum consistent with chronic obstruction. Persistent marked thickening of the terminal ileum was seen as well as a interval decrease in the size of the hypodense complex collection associated with fistulous tract and the anterior bladder wall. She underwent MRI of the pelvis last November which again showed marked acute on chronic terminal ileitis with associated with severe stricturing causing partial small bowel obstruction. Developing fistulization was again seen from the bladder dome to the small bowel with associated reactive cystitis/phlegmon formation. Because of the chronic inflammation, stricturing and persistent fistula, she underwent surgery early this year.   She has been on Inflectra 300mg every 8 weeks (since March 2023). She began having generalized abdominal pain that radiated to her back in late December 2022.  It was quite severe.  She also had multiple episodes of nausea and vomiting of bile.  Her stools were loose, up to 3 stools per day.  They were not bloody nor were they nocturnal.  She denies any trigger for the onset of her symptoms.  She went to a local ER in [place] and was hospitalized there from December 22 to December 25.  None of those records are available but apparently she was diagnosed with "terminal ileitis".  She apparently also had a small bowel obstruction which was treated conservatively and without NG tube placement.  She was given steroids, antibiotics and antiemetics with improvement of her symptoms.  Stool studies were apparently negative.  She was discharged with 20 mg of prednisone daily and a 11-day course of Cipro and Flagyl.  Her symptoms improved, though only transiently.  She was seen in this office in January, and was started back on prednisone 40mg QD; this was helpful.  She is no longer taking steroids.   She also underwent colonoscopy in February 2023. The colon appeared normal though the mucosa at the ileocecal valve was severely erythematous, friable, inflamed, markedly nodular and with some pseudopolyps present.  The scope could not pass across this area into the terminal ileum because of the luminal narrowing and inflammation.  Biopsies from this area showed active chronic colitis.  She was started on 6MP following that exam.  TPMT activity was low.  She is immune to hepatitis A and B. An inflammatory bowel disease profile in 2022 showed a markedly elevated Saccharomyces cerevisciae antibody, both IgA and IgG.  She was hospitalized March 2022 because of intractable nausea/vomiting and abdominal pain.  CT showed a segment of distal ileum approximately 7 cm in length with marked bowel wall thickening and edema which involved the ileocecal junction.  There was marked luminal narrowing of the involved lumen.  There was a segment of small bowel proximal to this which had an air-fluid level measuring 4 cm in caliber.  There was mild generalized bowel wall thickening and edema of the colon.  Moderate fat stranding around the cecum was seen.  TB testing in the hospital was "indeterminate" but chest x-ray was normal.  She was made NPO, and IV steroids were initiated.  She was started on inflectra, with her first infusion on 3/16.  Her symptoms gradually improved, and she was eventually discharged on 3/21.

## 2024-10-08 ENCOUNTER — WEB ENCOUNTER (OUTPATIENT)
Dept: URBAN - METROPOLITAN AREA CLINIC 27 | Facility: CLINIC | Age: 24
End: 2024-10-08

## 2024-10-08 ENCOUNTER — TELEPHONE ENCOUNTER (OUTPATIENT)
Dept: URBAN - METROPOLITAN AREA CLINIC 27 | Facility: CLINIC | Age: 24
End: 2024-10-08

## 2024-10-08 LAB
A/G RATIO: 1.2
ABSOLUTE BASOPHILS: 30
ABSOLUTE EOSINOPHILS: 198
ABSOLUTE LYMPHOCYTES: 1572
ABSOLUTE MONOCYTES: 798
ABSOLUTE NEUTROPHILS: 3402
ALBUMIN: 4.1
ALKALINE PHOSPHATASE: 86
ALT (SGPT): 8
AST (SGOT): 18
BASOPHILS: 0.5
BILIRUBIN, TOTAL: 0.3
BUN/CREATININE RATIO: (no result)
BUN: 14
CALCIUM: 9.1
CARBON DIOXIDE, TOTAL: 22
CHLORIDE: 107
CREATININE: 0.7
EGFR: 123
EOSINOPHILS: 3.3
FERRITIN, SERUM: 3
GLOBULIN, TOTAL: 3.3
GLUCOSE: 91
HEMATOCRIT: 30.3
HEMOGLOBIN: 9.1
IRON BIND.CAP.(TIBC): 469
IRON SATURATION: 4
IRON: 19
LYMPHOCYTES: 26.2
MCH: 21.9
MCHC: 30
MCV: 72.8
MONOCYTES: 13.3
MPV: 10.4
NEUTROPHILS: 56.7
PLATELET COUNT: 483
POTASSIUM: 5.8
PROTEIN, TOTAL: 7.4
RDW: 20.3
RED BLOOD CELL COUNT: 4.16
SODIUM: 138
WHITE BLOOD CELL COUNT: 6

## 2024-10-08 RX ORDER — ACETAMINOPHEN 650 MG
2 TABLETS AS NEEDED TABLET, EXTENDED RELEASE ORAL
Qty: 6 | Refills: 0 | OUTPATIENT
Start: 2024-10-09 | End: 2024-10-10

## 2024-10-08 RX ORDER — INFLIXIMAB 100 MG/10ML
AS DIRECTED INJECTION, POWDER, LYOPHILIZED, FOR SOLUTION INTRAVENOUS
OUTPATIENT
Start: 2024-10-09

## 2024-10-08 RX ORDER — DIPHENHYDRAMINE HCL 2 %
1 CAPSULE AT BEDTIME AS NEEDED CREAM (GRAM) TOPICAL ONCE A DAY
Qty: 30 | Refills: 0 | OUTPATIENT
Start: 2024-10-09 | End: 2024-11-08

## 2024-10-08 RX ORDER — FERRIC CARBOXYMALTOSE INJECTION 50 MG/ML
AS DIRECTED INJECTION, SOLUTION INTRAVENOUS
OUTPATIENT
Start: 2024-10-08 | End: 2024-10-22

## 2024-10-09 ENCOUNTER — WEB ENCOUNTER (OUTPATIENT)
Dept: URBAN - METROPOLITAN AREA CLINIC 27 | Facility: CLINIC | Age: 24
End: 2024-10-09

## 2024-10-21 ENCOUNTER — WEB ENCOUNTER (OUTPATIENT)
Dept: URBAN - METROPOLITAN AREA CLINIC 27 | Facility: CLINIC | Age: 24
End: 2024-10-21

## 2024-11-04 ENCOUNTER — TELEPHONE ENCOUNTER (OUTPATIENT)
Dept: URBAN - METROPOLITAN AREA CLINIC 23 | Facility: CLINIC | Age: 24
End: 2024-11-04

## 2024-11-10 ENCOUNTER — WEB ENCOUNTER (OUTPATIENT)
Dept: URBAN - METROPOLITAN AREA CLINIC 27 | Facility: CLINIC | Age: 24
End: 2024-11-10

## 2024-11-18 ENCOUNTER — OFFICE VISIT (OUTPATIENT)
Dept: URBAN - METROPOLITAN AREA CLINIC 91 | Facility: CLINIC | Age: 24
End: 2024-11-18
Payer: COMMERCIAL

## 2024-11-18 ENCOUNTER — WEB ENCOUNTER (OUTPATIENT)
Dept: URBAN - METROPOLITAN AREA CLINIC 27 | Facility: CLINIC | Age: 24
End: 2024-11-18

## 2024-11-18 VITALS
RESPIRATION RATE: 18 BRPM | HEART RATE: 83 BPM | HEIGHT: 65 IN | BODY MASS INDEX: 27.66 KG/M2 | WEIGHT: 166 LBS | TEMPERATURE: 98.1 F | SYSTOLIC BLOOD PRESSURE: 121 MMHG | DIASTOLIC BLOOD PRESSURE: 69 MMHG

## 2024-11-18 DIAGNOSIS — D50.9 IRON DEFICIENCY ANEMIA: ICD-10-CM

## 2024-11-18 PROCEDURE — 96365 THER/PROPH/DIAG IV INF INIT: CPT | Performed by: INTERNAL MEDICINE

## 2024-11-18 RX ORDER — INFLIXIMAB 100 MG/10ML
AS DIRECTED INJECTION, POWDER, LYOPHILIZED, FOR SOLUTION INTRAVENOUS
Status: ACTIVE | COMMUNITY
Start: 2024-10-09

## 2024-11-18 RX ORDER — MERCAPTOPURINE 50 MG/1
TAKE ONE TABLET DAILY TABLET ORAL ONCE DAILY
Qty: 90 | Refills: 3 | Status: ACTIVE | COMMUNITY

## 2024-11-18 RX ORDER — PREDNISONE 10 MG/1
TAKE 4 TABLETS BY MOUTH EVERY DAY FOR 5 DAYS, 3 FOR 5 DAYS, 2 FOR 5 DAYS, 1 FOR 5 DAYS TABLET ORAL
Qty: 50 TABLET | Refills: 0 | Status: ON HOLD | COMMUNITY

## 2024-12-02 ENCOUNTER — OFFICE VISIT (OUTPATIENT)
Dept: URBAN - METROPOLITAN AREA CLINIC 91 | Facility: CLINIC | Age: 24
End: 2024-12-02
Payer: COMMERCIAL

## 2024-12-02 VITALS
HEART RATE: 84 BPM | DIASTOLIC BLOOD PRESSURE: 79 MMHG | BODY MASS INDEX: 27.32 KG/M2 | RESPIRATION RATE: 18 BRPM | TEMPERATURE: 97.9 F | SYSTOLIC BLOOD PRESSURE: 114 MMHG | WEIGHT: 164 LBS | HEIGHT: 65 IN

## 2024-12-02 DIAGNOSIS — D50.9 IRON DEFICIENCY ANEMIA: ICD-10-CM

## 2024-12-02 PROCEDURE — 96365 THER/PROPH/DIAG IV INF INIT: CPT

## 2024-12-02 RX ORDER — PREDNISONE 10 MG/1
TAKE 4 TABLETS BY MOUTH EVERY DAY FOR 5 DAYS, 3 FOR 5 DAYS, 2 FOR 5 DAYS, 1 FOR 5 DAYS TABLET ORAL
Qty: 50 TABLET | Refills: 0 | Status: ON HOLD | COMMUNITY

## 2024-12-02 RX ORDER — INFLIXIMAB 100 MG/10ML
AS DIRECTED INJECTION, POWDER, LYOPHILIZED, FOR SOLUTION INTRAVENOUS
Status: ACTIVE | COMMUNITY
Start: 2024-10-09

## 2024-12-02 RX ORDER — MERCAPTOPURINE 50 MG/1
TAKE ONE TABLET DAILY TABLET ORAL ONCE DAILY
Qty: 90 | Refills: 3 | Status: ACTIVE | COMMUNITY

## 2025-01-08 ENCOUNTER — WEB ENCOUNTER (OUTPATIENT)
Dept: URBAN - METROPOLITAN AREA CLINIC 27 | Facility: CLINIC | Age: 25
End: 2025-01-08

## 2025-02-06 ENCOUNTER — OFFICE VISIT (OUTPATIENT)
Dept: URBAN - METROPOLITAN AREA CLINIC 27 | Facility: CLINIC | Age: 25
End: 2025-02-06

## 2025-02-13 ENCOUNTER — LAB OUTSIDE AN ENCOUNTER (OUTPATIENT)
Dept: URBAN - METROPOLITAN AREA CLINIC 27 | Facility: CLINIC | Age: 25
End: 2025-02-13

## 2025-02-13 ENCOUNTER — TELEPHONE ENCOUNTER (OUTPATIENT)
Dept: URBAN - METROPOLITAN AREA CLINIC 27 | Facility: CLINIC | Age: 25
End: 2025-02-13

## 2025-02-13 ENCOUNTER — OFFICE VISIT (OUTPATIENT)
Dept: URBAN - METROPOLITAN AREA CLINIC 27 | Facility: CLINIC | Age: 25
End: 2025-02-13
Payer: COMMERCIAL

## 2025-02-13 VITALS
HEART RATE: 82 BPM | DIASTOLIC BLOOD PRESSURE: 77 MMHG | HEIGHT: 65 IN | WEIGHT: 156 LBS | SYSTOLIC BLOOD PRESSURE: 111 MMHG | BODY MASS INDEX: 25.99 KG/M2

## 2025-02-13 DIAGNOSIS — R19.7 DIARRHEA: ICD-10-CM

## 2025-02-13 DIAGNOSIS — N32.1 COLOVESICAL FISTULA: ICD-10-CM

## 2025-02-13 DIAGNOSIS — K50.00 CROHN'S DISEASE OF SMALL INTESTINE: ICD-10-CM

## 2025-02-13 DIAGNOSIS — D50.9 IRON DEFICIENCY ANEMIA: ICD-10-CM

## 2025-02-13 DIAGNOSIS — R63.4 WEIGHT LOSS: ICD-10-CM

## 2025-02-13 DIAGNOSIS — R10.84 GENERALIZED ABDOMINAL PAIN: ICD-10-CM

## 2025-02-13 DIAGNOSIS — R11.2 NAUSEA & VOMITING: ICD-10-CM

## 2025-02-13 DIAGNOSIS — F41.9 ANXIETY: ICD-10-CM

## 2025-02-13 PROCEDURE — 99214 OFFICE O/P EST MOD 30 MIN: CPT | Performed by: INTERNAL MEDICINE

## 2025-02-13 RX ORDER — INFLIXIMAB 100 MG/10ML
AS DIRECTED INJECTION, POWDER, LYOPHILIZED, FOR SOLUTION INTRAVENOUS
Status: ACTIVE | COMMUNITY
Start: 2024-10-09

## 2025-02-13 RX ORDER — MERCAPTOPURINE 50 MG/1
TAKE ONE TABLET DAILY TABLET ORAL ONCE DAILY
Qty: 90 | Refills: 3 | Status: ACTIVE | COMMUNITY

## 2025-02-13 RX ORDER — PREDNISONE 10 MG/1
TAKE 4 TABLETS BY MOUTH EVERY DAY FOR 5 DAYS, 3 FOR 5 DAYS, 2 FOR 5 DAYS, 1 FOR 5 DAYS TABLET ORAL
Qty: 50 TABLET | Refills: 0 | Status: ON HOLD | COMMUNITY

## 2025-02-13 RX ORDER — MERCAPTOPURINE 50 MG/1
TAKE ONE TABLET DAILY TABLET ORAL ONCE DAILY
Qty: 30 | Refills: 5

## 2025-02-13 RX ORDER — POLYETHYLENE GLYCOL-3350 AND ELECTROLYTES 236; 6.74; 5.86; 2.97; 22.74 G/274.31G; G/274.31G; G/274.31G; G/274.31G; G/274.31G
4000 MILLITERS POWDER, FOR SOLUTION ORAL ONCE
Qty: 4000 MILLILITER | Refills: 0 | OUTPATIENT
Start: 2025-02-13 | End: 2025-02-14

## 2025-02-13 NOTE — HPI-TODAY'S VISIT:
Patient here for followup of Crohn's ileitis x3y.  She underwent laparoscopic ileocolic resection, takedown of an enterovesicular fistula and laparoscopic repair of enterovesicular fistula with cystoscopy and ureteral stent insertion January 2024. Since then she has done very well. Due to lack of insurance/insurance constraints, she still has not received an inflectra infusion since last June. She is currently doing very well. She has no GI symptoms aside from occasional minor hematochezia on the toilet paper which seems to occur if she passes a hard stool. She seems to have adequate water and fiber intake. She does take a probiotic. She has no other GI symptoms currently. She is still taking mercaptopurine 50 mg once daily. She was found to still be severely iron deficient and anemic per labs last October (hemoglobin 9.1, iron 19, iron sat 4%, ferritin 3) and subsequently received IV iron x 2 last fall. She states that this was very helpful and overall she feels "so much better". She is gaining weight appropriately. She has a follow-up appointment with Dr. Russell later this month. She has not had CBC and iron studies rechecked since the last IV iron infusion.   She does take a probiotic. Her TB test was negative last June. Labs at that time showed therapeutic infliximab levels and no antibodies. 6TG levels were therapeutic at last check. She underwent colonoscopy last June which revealed normal-appearing colonic and distal terminal ileum mucosa. An intact ileocolonic anastomosis in the mid-ascending colon was present. The anastomosis was erythematous, with multiple circumferential ulcerations present. Biopsies from the anastomosis showed chronic active enterocolitis with associated ulcer and granulation tissue, most consistent with anastomosis related changes. Random colon biopsies were normal. She denies significant anxiety at present.   CT abdomen pelvis October 2023 showed severely dilated distal small bowel, slightly worsened from prior exam with a thickened terminal ileum consistent with chronic obstruction. Persistent marked thickening of the terminal ileum was seen as well as a interval decrease in the size of the hypodense complex collection associated with fistulous tract and the anterior bladder wall. She underwent MRI of the pelvis November 2023 which again showed marked acute on chronic terminal ileitis with associated with severe stricturing causing partial small bowel obstruction. Developing fistulization was again seen from the bladder dome to the small bowel with associated reactive cystitis/phlegmon formation. Because of the chronic inflammation, stricturing and persistent fistula, she underwent surgery early last year.   She was started on Inflectra 300mg every 8 weeks March 2023. She began having generalized abdominal pain that radiated to her back in late December 2022.  It was quite severe.  She also had multiple episodes of nausea and vomiting of bile.  Her stools were loose, up to 3 stools per day.  They were not bloody nor were they nocturnal.  She denies any trigger for the onset of her symptoms.  She went to a local ER in [place] and was hospitalized there from December 22 to December 25.  None of those records are available but apparently she was diagnosed with "terminal ileitis".  She apparently also had a small bowel obstruction which was treated conservatively and without NG tube placement.  She was given steroids, antibiotics and antiemetics with improvement of her symptoms.  Stool studies were apparently negative.  She was discharged with 20 mg of prednisone daily and a 11-day course of Cipro and Flagyl.  Her symptoms improved, though only transiently.  She was seen in this office in January 2023, and was started back on prednisone 40mg QD; this was helpful.  She is no longer taking steroids.   She also underwent colonoscopy in February 2023. The colon appeared normal though the mucosa at the ileocecal valve was severely erythematous, friable, inflamed, markedly nodular and with some pseudopolyps present.  The scope could not pass across this area into the terminal ileum because of the luminal narrowing and inflammation.  Biopsies from this area showed active chronic colitis.  She was started on 6MP following that exam.  TPMT activity was low.  She is immune to hepatitis A and B. An inflammatory bowel disease profile in 2022 showed a markedly elevated Saccharomyces cerevisciae antibody, both IgA and IgG.  She was hospitalized March 2022 because of intractable nausea/vomiting and abdominal pain.  CT showed a segment of distal ileum approximately 7 cm in length with marked bowel wall thickening and edema which involved the ileocecal junction.  There was marked luminal narrowing of the involved lumen.  There was a segment of small bowel proximal to this which had an air-fluid level measuring 4 cm in caliber.  There was mild generalized bowel wall thickening and edema of the colon.  Moderate fat stranding around the cecum was seen.  TB testing in the hospital was "indeterminate" but chest x-ray was normal.  She was made NPO, and IV steroids were initiated.  She was started on inflectra, with her first infusion on 3/16.  Her symptoms gradually improved, and she was eventually discharged in March 2023.

## 2025-02-14 LAB
A/G RATIO: 1.4
ABSOLUTE BASOPHILS: 30
ABSOLUTE EOSINOPHILS: 112
ABSOLUTE LYMPHOCYTES: 1101
ABSOLUTE MONOCYTES: 434
ABSOLUTE NEUTROPHILS: 2623
ALBUMIN: 4.2
ALKALINE PHOSPHATASE: 115
ALT (SGPT): 6
AMYLASE: 43
AST (SGOT): 11
BASOPHILS: 0.7
BILIRUBIN, TOTAL: 0.4
BUN/CREATININE RATIO: (no result)
BUN: 10
C-REACTIVE PROTEIN, QUANT: <3
CALCIUM: 9
CARBON DIOXIDE, TOTAL: 25
CHLORIDE: 107
CREATININE: 0.6
EGFR: 128
EOSINOPHILS: 2.6
FERRITIN, SERUM: 150
GLOBULIN, TOTAL: 2.9
GLUCOSE: 82
HEMATOCRIT: 35.5
HEMOGLOBIN: 12.2
IRON BIND.CAP.(TIBC): 289
IRON SATURATION: 26
IRON: 74
LIPASE: 20
LYMPHOCYTES: 25.6
MCH: 29
MCHC: 34.4
MCV: 84.3
MONOCYTES: 10.1
MPV: 11.1
NEUTROPHILS: 61
PLATELET COUNT: 326
POTASSIUM: 4.5
PROTEIN, TOTAL: 7.1
RDW: 18.7
RED BLOOD CELL COUNT: 4.21
SED RATE BY MODIFIED: 6
SODIUM: 139
WHITE BLOOD CELL COUNT: 4.3

## 2025-03-06 ENCOUNTER — TELEPHONE ENCOUNTER (OUTPATIENT)
Dept: URBAN - METROPOLITAN AREA CLINIC 27 | Facility: CLINIC | Age: 25
End: 2025-03-06

## 2025-03-06 RX ORDER — INFLIXIMAB 100 MG/10ML
INJECT INJECTION, POWDER, LYOPHILIZED, FOR SOLUTION INTRAVENOUS
Refills: 0
Start: 2024-10-09

## 2025-06-09 ENCOUNTER — CLAIMS CREATED FROM THE CLAIM WINDOW (OUTPATIENT)
Dept: URBAN - METROPOLITAN AREA SURGERY CENTER 7 | Facility: SURGERY CENTER | Age: 25
End: 2025-06-09
Payer: COMMERCIAL

## 2025-06-09 ENCOUNTER — CLAIMS CREATED FROM THE CLAIM WINDOW (OUTPATIENT)
Dept: URBAN - METROPOLITAN AREA CLINIC 4 | Facility: CLINIC | Age: 25
End: 2025-06-09
Payer: COMMERCIAL

## 2025-06-09 ENCOUNTER — LAB OUTSIDE AN ENCOUNTER (OUTPATIENT)
Dept: URBAN - METROPOLITAN AREA CLINIC 27 | Facility: CLINIC | Age: 25
End: 2025-06-09

## 2025-06-09 ENCOUNTER — TELEPHONE ENCOUNTER (OUTPATIENT)
Dept: URBAN - METROPOLITAN AREA CLINIC 27 | Facility: CLINIC | Age: 25
End: 2025-06-09

## 2025-06-09 DIAGNOSIS — K63.89 OTHER SPECIFIED DISEASES OF INTESTINE: ICD-10-CM

## 2025-06-09 DIAGNOSIS — Z98.0 INTESTINAL BYPASS AND ANASTOMOSIS STATUS: ICD-10-CM

## 2025-06-09 DIAGNOSIS — K63.3 ULCER OF INTESTINE: ICD-10-CM

## 2025-06-09 DIAGNOSIS — Z12.11 COLON CANCER SCREENING (HIGH RISK): ICD-10-CM

## 2025-06-09 DIAGNOSIS — K50.90 CROHN DISEASE: ICD-10-CM

## 2025-06-09 DIAGNOSIS — K50.80 CROHN'S DISEASE OF BOTH SMALL AND LARGE INTESTINE WITHOUT COMPLICATION: ICD-10-CM

## 2025-06-09 PROCEDURE — 45380 COLONOSCOPY AND BIOPSY: CPT | Performed by: INTERNAL MEDICINE

## 2025-06-09 PROCEDURE — 88305 TISSUE EXAM BY PATHOLOGIST: CPT | Performed by: PATHOLOGY

## 2025-06-09 PROCEDURE — 00812 ANES LWR INTST SCR COLSC: CPT | Performed by: NURSE ANESTHETIST, CERTIFIED REGISTERED

## 2025-06-09 RX ORDER — INFLIXIMAB 100 MG/10ML
INJECT INJECTION, POWDER, LYOPHILIZED, FOR SOLUTION INTRAVENOUS
Refills: 0 | Status: ACTIVE | COMMUNITY
Start: 2024-10-09

## 2025-06-09 RX ORDER — MERCAPTOPURINE 50 MG/1
TAKE ONE TABLET DAILY TABLET ORAL ONCE DAILY
Qty: 30 | Refills: 5 | Status: ACTIVE | COMMUNITY

## 2025-06-09 RX ORDER — PREDNISONE 10 MG/1
TAKE 4 TABLETS BY MOUTH EVERY DAY FOR 5 DAYS, 3 FOR 5 DAYS, 2 FOR 5 DAYS, 1 FOR 5 DAYS TABLET ORAL
Qty: 50 TABLET | Refills: 0 | Status: ON HOLD | COMMUNITY

## 2025-06-13 LAB
MITOGEN-NIL: 6.04
QUANTIFERON NIL VALUE: 0.01
QUANTIFERON TB1 AG VALUE: 0.03
QUANTIFERON TB2 AG VALUE: 0.02
QUANTIFERON-TB GOLD PLUS: NEGATIVE

## 2025-06-19 ENCOUNTER — WEB ENCOUNTER (OUTPATIENT)
Dept: URBAN - METROPOLITAN AREA CLINIC 27 | Facility: CLINIC | Age: 25
End: 2025-06-19

## 2025-06-20 ENCOUNTER — TELEPHONE ENCOUNTER (OUTPATIENT)
Dept: URBAN - METROPOLITAN AREA CLINIC 27 | Facility: CLINIC | Age: 25
End: 2025-06-20

## 2025-06-23 ENCOUNTER — WEB ENCOUNTER (OUTPATIENT)
Dept: URBAN - METROPOLITAN AREA CLINIC 27 | Facility: CLINIC | Age: 25
End: 2025-06-23

## 2025-07-01 ENCOUNTER — WEB ENCOUNTER (OUTPATIENT)
Dept: URBAN - METROPOLITAN AREA CLINIC 27 | Facility: CLINIC | Age: 25
End: 2025-07-01